# Patient Record
Sex: FEMALE | Race: BLACK OR AFRICAN AMERICAN | NOT HISPANIC OR LATINO
[De-identification: names, ages, dates, MRNs, and addresses within clinical notes are randomized per-mention and may not be internally consistent; named-entity substitution may affect disease eponyms.]

---

## 2021-12-28 PROBLEM — Z00.00 ENCOUNTER FOR PREVENTIVE HEALTH EXAMINATION: Status: ACTIVE | Noted: 2021-12-28

## 2022-01-12 ENCOUNTER — APPOINTMENT (OUTPATIENT)
Dept: PULMONOLOGY | Facility: CLINIC | Age: 27
End: 2022-01-12

## 2022-01-31 ENCOUNTER — APPOINTMENT (OUTPATIENT)
Dept: PULMONOLOGY | Facility: CLINIC | Age: 27
End: 2022-01-31
Payer: MEDICARE

## 2022-01-31 VITALS
RESPIRATION RATE: 14 BRPM | TEMPERATURE: 98.8 F | HEART RATE: 91 BPM | OXYGEN SATURATION: 98 % | HEIGHT: 63 IN | WEIGHT: 108 LBS | DIASTOLIC BLOOD PRESSURE: 65 MMHG | BODY MASS INDEX: 19.14 KG/M2 | SYSTOLIC BLOOD PRESSURE: 104 MMHG

## 2022-01-31 DIAGNOSIS — G47.10 HYPERSOMNIA, UNSPECIFIED: ICD-10-CM

## 2022-01-31 DIAGNOSIS — R06.83 SNORING: ICD-10-CM

## 2022-01-31 PROCEDURE — 99204 OFFICE O/P NEW MOD 45 MIN: CPT

## 2022-02-01 NOTE — HISTORY OF PRESENT ILLNESS
[FreeTextEntry1] : 01/31/2022 :  CARA LEONARD is a 26 non smoker female  with PMHx asthma on Albuterol ( rarely takes, no recent hospitalization), tracheotomy at the age 16 months (unclear reason) who is here for possible sleep apnea. She lives with her grandmother on disability.  here for initial visit for possible sleep apnea. \par \par She is here with her grandmother and reports snoring but denies apnea. She also reports with morning HA and dry mouth. She had tonsillectomy and adenoidectomy before age 3, required tracheotomy for several weeks afterwards.  Unclear what reason for surgery was originally- followed at [facility] until few years ago.  GM has no knowledge of diagnosis. \par \par Significant excessive daytime somnolence with Zachary sleepiness scale (out of 24 points) = 12.   \par \par \par Sleep Routine:\par \par She goes to bed at 12A , sleep latency is about 1 hour , she wakes up twice, WASO is about 5 min, and then she is up at 8A. She naps for 1-2 hour 3x/week  . \par  \par She denies cataplexy, RLS, parasomnia, or any other sleep behavioral issues. \par \par \par

## 2022-02-01 NOTE — REASON FOR VISIT
[Initial Evaluation] : an initial evaluation [Family Member] : family member [FreeTextEntry2] : possible sleep apnea

## 2022-02-01 NOTE — PHYSICAL EXAM
[General Appearance - In No Acute Distress] : no acute distress [Normal Conjunctiva] : the conjunctiva exhibited no abnormalities [Micrognathia] : micrognathia [IV] : IV [Neck Cervical Mass (___cm)] : no neck mass was observed [Jugular Venous Distention Increased] : there was no jugular-venous distention [Apical Impulse] : the apical impulse was normal [Heart Sounds] : normal S1 and S2 [Exaggerated Use Of Accessory Muscles For Inspiration] : no accessory muscle use [No Focal Deficits] : no focal deficits [Oriented To Time, Place, And Person] : oriented to person, place, and time [Impaired Insight] : insight and judgment were intact [Affect] : the affect was normal [Mood] : the mood was normal [Normal Oropharynx] : abnormal oropharynx [Low Lying Soft Palate] : low lying soft palate [FreeTextEntry1] : palate normal [Abnormal Walk] : normal gait [Musculoskeletal - Swelling] : no joint swelling seen [Motor Tone] : muscle strength and tone were normal [Nail Clubbing] : no clubbing of the fingernails [Cyanosis, Localized] : no localized cyanosis [Petechial Hemorrhages (___cm)] : no petechial hemorrhages [] : no ischemic changes

## 2022-02-01 NOTE — ASSESSMENT
[FreeTextEntry1] : 27 y/o F with snoring and c/o  hypersomnolence who is here for initial visit.  Unclear history of surgery including trach <2 yr  old- ? congenital anomaly.  Need to get past records.  Will assess with overnight polysomnography

## 2022-02-08 ENCOUNTER — APPOINTMENT (OUTPATIENT)
Dept: SLEEP CENTER | Facility: HOSPITAL | Age: 27
End: 2022-02-08

## 2022-02-08 ENCOUNTER — OUTPATIENT (OUTPATIENT)
Dept: OUTPATIENT SERVICES | Facility: HOSPITAL | Age: 27
LOS: 1 days | End: 2022-02-08
Payer: MEDICARE

## 2022-02-08 DIAGNOSIS — G47.33 OBSTRUCTIVE SLEEP APNEA (ADULT) (PEDIATRIC): ICD-10-CM

## 2022-02-08 PROCEDURE — 95810 POLYSOM 6/> YRS 4/> PARAM: CPT | Mod: 26

## 2022-02-08 PROCEDURE — 95810 POLYSOM 6/> YRS 4/> PARAM: CPT

## 2022-02-10 ENCOUNTER — APPOINTMENT (OUTPATIENT)
Dept: PULMONOLOGY | Facility: CLINIC | Age: 27
End: 2022-02-10

## 2022-04-26 ENCOUNTER — APPOINTMENT (OUTPATIENT)
Dept: PULMONOLOGY | Facility: CLINIC | Age: 27
End: 2022-04-26